# Patient Record
Sex: MALE | Race: WHITE
[De-identification: names, ages, dates, MRNs, and addresses within clinical notes are randomized per-mention and may not be internally consistent; named-entity substitution may affect disease eponyms.]

---

## 2019-01-27 ENCOUNTER — HOSPITAL ENCOUNTER (EMERGENCY)
Dept: HOSPITAL 83 - ED | Age: 69
Discharge: HOME | End: 2019-01-27
Payer: MEDICARE

## 2019-01-27 VITALS — BODY MASS INDEX: 27.09 KG/M2 | HEIGHT: 71.97 IN | WEIGHT: 200 LBS

## 2019-01-27 DIAGNOSIS — Z87.891: ICD-10-CM

## 2019-01-27 DIAGNOSIS — Z88.0: ICD-10-CM

## 2019-01-27 DIAGNOSIS — I10: ICD-10-CM

## 2019-01-27 DIAGNOSIS — J20.9: Primary | ICD-10-CM

## 2019-06-12 ENCOUNTER — HOSPITAL ENCOUNTER (EMERGENCY)
Dept: HOSPITAL 83 - ED | Age: 69
Discharge: HOME | End: 2019-06-12
Payer: MEDICARE

## 2019-06-12 VITALS — BODY MASS INDEX: 27.09 KG/M2 | HEIGHT: 71.97 IN | WEIGHT: 200 LBS

## 2019-06-12 DIAGNOSIS — R06.2: ICD-10-CM

## 2019-06-12 DIAGNOSIS — Z88.0: ICD-10-CM

## 2019-06-12 DIAGNOSIS — Z79.899: ICD-10-CM

## 2019-06-12 DIAGNOSIS — R09.81: ICD-10-CM

## 2019-06-12 DIAGNOSIS — R05: Primary | ICD-10-CM

## 2022-03-30 ENCOUNTER — APPOINTMENT (RX ONLY)
Dept: URBAN - METROPOLITAN AREA CLINIC 12 | Facility: CLINIC | Age: 72
Setting detail: DERMATOLOGY
End: 2022-03-30

## 2022-03-30 DIAGNOSIS — L72.8 OTHER FOLLICULAR CYSTS OF THE SKIN AND SUBCUTANEOUS TISSUE: ICD-10-CM

## 2022-03-30 PROCEDURE — ? COUNSELING

## 2022-03-30 PROCEDURE — ? FOLLOW UP FOR NEXT VISIT

## 2022-03-30 PROCEDURE — 99202 OFFICE O/P NEW SF 15 MIN: CPT

## 2022-03-30 ASSESSMENT — LOCATION ZONE DERM: LOCATION ZONE: TRUNK

## 2022-03-30 ASSESSMENT — LOCATION DETAILED DESCRIPTION DERM: LOCATION DETAILED: STERNUM

## 2022-03-30 ASSESSMENT — LOCATION SIMPLE DESCRIPTION DERM: LOCATION SIMPLE: CHEST

## 2022-03-30 NOTE — PROCEDURE: COUNSELING
Patient Specific Counseling (Will Not Stick From Patient To Patient): Pt would like removed,  advised let inflamation calm down, RTC2 mo for procedure with dr. Jordan, recurrence discussed
Detail Level: Detailed

## 2022-03-30 NOTE — HPI: EVALUATION OF SKIN LESION(S)
What Type Of Note Output Would You Prefer (Optional)?: Bullet Format
Hpi Title: Evaluation of a Skin Lesion
How Severe Are Your Spot(S)?: moderate
Additional History: Declines fbe \\nLesion was removed previously years ago

## 2023-10-10 ENCOUNTER — HOSPITAL ENCOUNTER (OUTPATIENT)
Dept: HOSPITAL 83 - RESCLI | Age: 73
Discharge: HOME | End: 2023-10-10
Attending: INTERNAL MEDICINE
Payer: MEDICARE

## 2023-10-10 DIAGNOSIS — J45.909: Primary | ICD-10-CM

## 2023-10-10 DIAGNOSIS — N52.9: ICD-10-CM

## 2023-10-10 DIAGNOSIS — E03.9: ICD-10-CM

## 2023-10-10 DIAGNOSIS — I10: ICD-10-CM

## 2023-10-10 DIAGNOSIS — Z88.0: ICD-10-CM

## 2023-10-10 DIAGNOSIS — Z85.46: ICD-10-CM

## 2023-10-10 DIAGNOSIS — Z79.899: ICD-10-CM

## 2023-10-10 DIAGNOSIS — Z98.890: ICD-10-CM

## 2023-11-15 ENCOUNTER — APPOINTMENT (RX ONLY)
Dept: URBAN - METROPOLITAN AREA CLINIC 12 | Facility: CLINIC | Age: 73
Setting detail: DERMATOLOGY
End: 2023-11-15

## 2023-11-15 DIAGNOSIS — L30.9 DERMATITIS, UNSPECIFIED: ICD-10-CM | Status: INADEQUATELY CONTROLLED

## 2023-11-15 PROCEDURE — ? DIAGNOSIS COMMENT

## 2023-11-15 PROCEDURE — 99213 OFFICE O/P EST LOW 20 MIN: CPT

## 2023-11-15 PROCEDURE — ? PRESCRIPTION

## 2023-11-15 RX ORDER — DESONIDE 0.5 MG/G
OINTMENT TOPICAL
Qty: 60 | Refills: 0 | Status: ERX | COMMUNITY
Start: 2023-11-15

## 2023-11-15 RX ORDER — MUPIROCIN 20 MG/G
OINTMENT TOPICAL
Qty: 22 | Refills: 0 | Status: ERX | COMMUNITY
Start: 2023-11-15

## 2023-11-15 RX ADMIN — DESONIDE: 0.5 OINTMENT TOPICAL at 00:00

## 2023-11-15 RX ADMIN — MUPIROCIN: 20 OINTMENT TOPICAL at 00:00

## 2023-11-15 ASSESSMENT — LOCATION DETAILED DESCRIPTION DERM
LOCATION DETAILED: RIGHT INFERIOR MEDIAL BUCCAL CHEEK
LOCATION DETAILED: RIGHT FOREHEAD
LOCATION DETAILED: RIGHT SUPERIOR ANTERIOR NECK
LOCATION DETAILED: SUPERIOR MID FOREHEAD
LOCATION DETAILED: RIGHT CENTRAL POSTAURICULAR SKIN

## 2023-11-15 ASSESSMENT — LOCATION SIMPLE DESCRIPTION DERM
LOCATION SIMPLE: RIGHT ANTERIOR NECK
LOCATION SIMPLE: RIGHT FOREHEAD
LOCATION SIMPLE: SCALP
LOCATION SIMPLE: SUPERIOR FOREHEAD
LOCATION SIMPLE: RIGHT CHEEK

## 2023-11-15 ASSESSMENT — LOCATION ZONE DERM
LOCATION ZONE: NECK
LOCATION ZONE: FACE
LOCATION ZONE: SCALP

## 2023-11-15 NOTE — HPI: RASH
What Type Of Note Output Would You Prefer (Optional)?: Bullet Format
How Severe Is Your Rash?: mild
Is This A New Presentation, Or A Follow-Up?: Rash
Additional History: Declines FBE \\n\\nPT- concerned with newly developed rash on back of neck and forehead region / new laundry detergent and new cat / previously tested for allergies and is reactive to cat dander